# Patient Record
Sex: MALE | Race: WHITE | Employment: UNEMPLOYED | URBAN - METROPOLITAN AREA
[De-identification: names, ages, dates, MRNs, and addresses within clinical notes are randomized per-mention and may not be internally consistent; named-entity substitution may affect disease eponyms.]

---
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
## 2019-07-31 ENCOUNTER — OFFICE VISIT (OUTPATIENT)
Dept: FAMILY MEDICINE | Facility: OTHER | Age: 19
End: 2019-07-31
Payer: COMMERCIAL

## 2019-07-31 ENCOUNTER — NURSE TRIAGE (OUTPATIENT)
Dept: FAMILY MEDICINE | Facility: OTHER | Age: 19
End: 2019-07-31

## 2019-07-31 VITALS
DIASTOLIC BLOOD PRESSURE: 70 MMHG | WEIGHT: 315 LBS | TEMPERATURE: 97.9 F | SYSTOLIC BLOOD PRESSURE: 122 MMHG | OXYGEN SATURATION: 97 % | HEIGHT: 70 IN | RESPIRATION RATE: 16 BRPM | HEART RATE: 77 BPM | BODY MASS INDEX: 45.1 KG/M2

## 2019-07-31 DIAGNOSIS — H65.01 RIGHT ACUTE SEROUS OTITIS MEDIA, RECURRENCE NOT SPECIFIED: ICD-10-CM

## 2019-07-31 DIAGNOSIS — J20.9 ACUTE BRONCHITIS WITH SYMPTOMS > 10 DAYS: Primary | ICD-10-CM

## 2019-07-31 PROCEDURE — 99213 OFFICE O/P EST LOW 20 MIN: CPT | Performed by: PHYSICIAN ASSISTANT

## 2019-07-31 RX ORDER — ALBUTEROL SULFATE 90 UG/1
2 AEROSOL, METERED RESPIRATORY (INHALATION) EVERY 6 HOURS PRN
Qty: 1 INHALER | Refills: 0 | Status: SHIPPED | OUTPATIENT
Start: 2019-07-31

## 2019-07-31 RX ORDER — GUAIFENESIN 600 MG/1
1200 TABLET, EXTENDED RELEASE ORAL 2 TIMES DAILY
Qty: 10 TABLET | Refills: 0 | Status: SHIPPED | OUTPATIENT
Start: 2019-07-31

## 2019-07-31 RX ORDER — PREDNISONE 20 MG/1
20 TABLET ORAL DAILY
Qty: 5 TABLET | Refills: 0 | Status: SHIPPED | OUTPATIENT
Start: 2019-07-31 | End: 2019-12-13

## 2019-07-31 ASSESSMENT — MIFFLIN-ST. JEOR: SCORE: 2491.69

## 2019-07-31 NOTE — PROGRESS NOTES
"Subjective     Jonas Schmidt is a 19 year old male who presents to clinic today for the following health issues:    HPI   Acute Illness   Acute illness concerns: cough, shortness of breath when coughing  Onset: \"a couple weeks ago\"    Fever: no    Chills/Sweats: no    Headache (location?): YES    Sinus Pressure:YES- facial pain    Conjunctivitis:  no    Ear Pain: no    Rhinorrhea: YES    Congestion: YES    Sore Throat: no     Cough: YES-productive of green sputum    Wheeze: no    Shortness of Breath: YES when coughing    Decreased Appetite: YES    Nausea: no    Vomiting: no - \"cough to the point of wanting to throw up\"    Diarrhea:  no    Dysuria/Freq.: no    Fatigue/Achiness: YES    Sick/Strep Exposure: YES- girlfriend     Therapies Tried and outcome: none    He has had a cough for the past 2 weeks and today noticed shortness of breath. He has noticed wheezing as well but denies fevers or chills. He is a smoker 1/2 PPD and is trying to quit. He used to smoke 1.5 PPD. He has not tried anything over the counter. He works concrete construction.    Reviewed and updated as needed this visit by Provider  Tobacco  Allergies  Meds  Problems  Med Hx  Surg Hx  Fam Hx      Review of Systems   ROS COMP: Constitutional, HEENT, cardiovascular, pulmonary, gi and gu systems are negative, except as otherwise noted.      Objective    /70   Pulse 77   Temp 97.9  F (36.6  C) (Temporal)   Resp 16   Ht 1.78 m (5' 10.08\")   Wt 147.4 kg (325 lb)   SpO2 97%   BMI 46.53 kg/m    Body mass index is 46.53 kg/m .  Physical Exam   GENERAL: healthy, alert and no distress  EYES: Eyes grossly normal to inspection, PERRL and conjunctivae and sclerae normal  HENT: ear canals and left TM normal. Right TM is bulging and erythematous with serous effusion. Nasal mucosa is mildly erythematous bilaterally. Pharynx is clear.   NECK: no adenopathy, no asymmetry, masses, or scars and thyroid normal to palpation  RESP: lungs with scattered " wheezes and rhonchi throughout. No rales.   CV: regular rate and rhythm, normal S1 S2, no S3 or S4, no murmur, click or rub      Assessment & Plan       ICD-10-CM    1. Acute bronchitis with symptoms > 10 days J20.9 amoxicillin-clavulanate (AUGMENTIN) 875-125 MG tablet     predniSONE (DELTASONE) 20 MG tablet     albuterol (PROAIR HFA/PROVENTIL HFA/VENTOLIN HFA) 108 (90 Base) MCG/ACT inhaler     guaiFENesin (MUCINEX) 600 MG 12 hr tablet   2. Right acute serous otitis media, recurrence not specified H65.01 amoxicillin-clavulanate (AUGMENTIN) 875-125 MG tablet        Right otitis media along with scattered wheezes and rhonchi throughout consistent with bronchitis. Will treat Augmentin to cover for otitis media and potential development of pneumonia to take as directed to treat for right sided.  I recommend he take a probiotic or daily Activia yogurt while on this.  Will also prescribe prednisone 20 mg to take daily in the mornings with food for 5 days to help with the wheezing and shortness of breath.  Will prescribe an albuterol inhaler to use every 6 hours for the next 2 days then every 6 hours as needed.  Will prescribe Mucinex to help cough become more productive.  I recommend plenty of fluids along with breathing in warm, moist air/humidifer.   Honey and tea can help with cough.  If symptoms are not improving within the next week, he should be seen again.   Instructed to continue to work on quitting smoking.    I recommend he schedule a physical in the next few months.      Aravind Diaz PA-C  [facility]

## 2019-07-31 NOTE — PATIENT INSTRUCTIONS
Will treat you with Augmentin to take as directed to treat for right sided ear infection and bronchitis.  Take a probiotic or daily Activia yogurt while on this.  Will also prescribe prednisone to take daily in the mornings with food for 5 days to help with the wheezing and shortness of breath.  The albuterol inhaler will also help to use every 6 hours for the next 2 days then every 6 hours as needed.  Will prescribe Mucinex to help get up the phlegm.  I recommend plenty of fluids along with breathing in warm, moist air/humidifer.   Honey and tea can help with cough.  If symptoms are not improving within the next week, you should be seen again.   Work on quitting smoking.    Schedule a physical in the next few months.

## 2019-07-31 NOTE — LETTER
[address]  [facility] [address]  Phone: [phone]    July 31, 2019        Jonas Schmidt  [address] [address] [place] [id number]          To whom it may concern:    RE: Jonas Schmidt    Patient was seen and treated today at our clinic. Please excuse patient from work from 7/31-8/2 due to illness. Thank you.     Please contact me for questions or concerns.      Sincerely,        Aravind Diaz PA-C

## 2019-07-31 NOTE — TELEPHONE ENCOUNTER
Spoke with patient.  Started a couple weeks ago with a really bad cough and getting worse.  Feeling good now, but would like to be seen to see if he has anything going on. No SOB unless coughing.  Able to talk in full sentences.    Reason for Disposition    Caller wants child seen for non-urgent problem    Additional Information    Negative: Severe difficulty breathing (struggling for each breath, unable to speak or cry because of difficulty breathing, making grunting noises with each breath)    Negative: Child has passed out or stopped breathing    Negative: Lips or face are bluish (or gray) when not coughing    Negative: Sounds like a life-threatening emergency to the triager    Negative: Stridor (harsh sound with breathing in) is present    Negative: Hoarse voice with deep barky cough and croup in the community    Negative: Choked on a small object or food that could be caught in the throat    Negative: Previous diagnosis of asthma (or RAD) OR regular use of asthma medicines for wheezing    Negative: Age < 2 years and given albuterol inhaler or neb for home treatment to use within the last 2 weeks    Negative: Wheezing is present, but NO previous diagnosis of asthma or NO regular use of asthma medicines for wheezing    Negative: Coughing occurs within 21 days of whooping cough EXPOSURE    Negative: Choked on a small object that could be caught in the throat    Negative: Age < 12 weeks with fever 100.4 F (38.0 C) or higher rectally    Negative: Blood coughed up    Negative: Ribs are pulling in with each breath (retractions) when not coughing    Negative: Stridor (harsh sound with breathing in) is present    Negative: Drooling or spitting out saliva (because can't swallow)    Negative: Fever and weak immune system (sickle cell disease, HIV, chemotherapy, organ transplant, chronic steroids, etc)    Negative: High-risk child (e.g., underlying heart, lung or severe neuromuscular disease)    Negative: Child sounds very  sick or weak to the triager    Negative: Difficulty breathing present when not coughing    Negative: Wheezing (purring or whistling sound) occurs    Negative: Lips have turned bluish during coughing    Negative: Rapid breathing (Breaths/min > 60 if < 2 mo; > 50 if 2-12 mo; > 40 if 1-5 years; > 30 if 6-11 years; > 20 if > 12 years old)    Negative: Fever > 105 F (40.6 C)    Negative: SEVERE chest pain    Negative: Can't take a deep breath because of chest pain    Negative: Continuous (nonstop) coughing    Negative: Age < 3 months old (Exception: coughs a few times)    Negative: Age < 2 years and ear infection suspected by triager    Negative: Fever present > 3 days    Negative: Fever returns after going away > 24 hours and symptoms worse or not improved    Negative: Earache    Negative: Sinus pain (not just congestion) persists > 48 hours after using nasal washes (Age: 6 years or older)    Negative: Age 3-6 months and fever with cough    Negative: Triager thinks child needs to be seen for non-urgent problem    Negative: Cough has been present > 3 weeks    Negative: Whooping cough in the community and coughing lasts > 2 weeks    Negative: Nasal discharge present > 14 days    Negative: Pollen-related cough not responsive to antihistamines    Negative: Coughing has kept home from school for 3 or more days    Negative: Vomiting from hard coughing occurs 3 or more times    Negative: Chest pain that's present even when not coughing    Protocols used: COUGH-P-OH

## 2019-07-31 NOTE — TELEPHONE ENCOUNTER
Reason for call:  Patient reporting a symptom    Symptom or request: hard time breathing thinks he has build up in his chest    Duration (how long have symptoms been present):     Have you been treated for this before? No    Additional comments: patient called to scheduled states non urgent. Please triage    Phone Number patient can be reached at:  [phone]    Best Time:  any    Can we leave a detailed message on this number:  YES    Call taken on 7/31/2019 at 8:49 AM by Mariposa Levin

## 2019-12-11 NOTE — PROGRESS NOTES
"Subjective     Jonas Schmidt is a 19 year old male who presents to clinic today for the following health issues:    HPI   WART(S)  Onset: \"A long time\"    Description:   Location: left forearm  Number of warts: 6  Painful: no    Accompanying Signs & Symptoms:  Signs of infection: no    History:   History of trauma: no  Prior warts: YES - same ones    Therapies Tried and outcome: liquid nitrogen      Patient Active Problem List   Diagnosis     Obesity     Common wart     Anxiety state     GERD (gastroesophageal reflux disease)     ADHD (attention deficit hyperactivity disorder), combined type     Past Surgical History:   Procedure Laterality Date     HC CLOSED TX RAD/ULNAR SHAFT FX W/O MANIP  08/14/2002    Closed reduction percutaneous pinning, ulnar shaft fracture and closed reduction radiocapitellar joint, application of long-arm splint.       Social History     Tobacco Use     Smoking status: Current Every Day Smoker     Packs/day: 0.50     Years: 3.00     Pack years: 1.50     Types: Cigarettes     Smokeless tobacco: Never Used     Tobacco comment: no smokers at home   Substance Use Topics     Alcohol use: No     Family History   Problem Relation Age of Onset     Hypertension Mother      Thyroid Disease Mother      Heart Disease Mother         Murmur     Cancer Maternal Grandmother      Family History Negative No family hx of      Diabetes No family hx of      Lipids No family hx of          Current Outpatient Medications   Medication Sig Dispense Refill     albuterol (PROAIR HFA, PROVENTIL HFA, VENTOLIN HFA) 108 (90 BASE) MCG/ACT inhaler Inhale 2 puffs into the lungs every 6 hours as needed for shortness of breath / dyspnea or wheezing 1 Inhaler 0     atomoxetine (STRATTERA) 40 MG capsule Take 1 capsule (40 mg) by mouth daily 30 capsule 1     cetirizine (ZYRTEC) 10 MG tablet Take 1 tablet (10 mg) by mouth every evening 90 tablet 1     fluticasone (FLONASE) 50 MCG/ACT nasal spray Spray 1-2 sprays into both " "nostrils daily 1 Package 11     guaiFENesin (MUCINEX) 600 MG 12 hr tablet Take 2 tablets (1,200 mg) by mouth 2 times daily 10 tablet 0     omeprazole 20 MG tablet Take 1 tablet (20 mg) by mouth daily Take 30-60 minutes before a meal. 90 tablet 1     albuterol (PROAIR HFA/PROVENTIL HFA/VENTOLIN HFA) 108 (90 Base) MCG/ACT inhaler Inhale 2 puffs into the lungs every 6 hours as needed for shortness of breath / dyspnea or wheezing (Patient not taking: Reported on 12/13/2019) 1 Inhaler 0     Salicylic Acid 40 % PADS Externally apply 1 each topically At Bedtime 3 each 0     No Known Allergies  BP Readings from Last 3 Encounters:   12/13/19 112/66   07/31/19 122/70   08/23/18 110/70    Wt Readings from Last 3 Encounters:   12/13/19 (!) 153.7 kg (338 lb 14.4 oz)   07/31/19 147.4 kg (325 lb)   08/23/18 139.3 kg (307 lb) (>99 %)*     * Growth percentiles are based on CDC (Boys, 2-20 Years) data.                    Reviewed and updated as needed this visit by Provider         Review of Systems   ROS COMP: Constitutional, HEENT, cardiovascular, pulmonary, gi and gu systems are negative, except as otherwise noted.      Objective    /66   Pulse 63   Temp 97.7  F (36.5  C) (Oral)   Resp 16   Ht 1.791 m (5' 10.51\")   Wt (!) 153.7 kg (338 lb 14.4 oz)   SpO2 99%   BMI 47.92 kg/m    Body mass index is 47.92 kg/m .  Physical Exam   GENERAL APPEARANCE: alert and no distress  SKIN: 5 hyperkeratotic warts on left proximal extensor surface of forearm and one small papular wart    Diagnostic Test Results:  Labs reviewed in Epic        Assessment & Plan     1. Viral warts, unspecified type  Procedure note:   Procedure was discussed with patient. Site(s) as described above were identified and cleaned with alcohol.          The site were identified. The canister is held in an upright position with the cryotip nozzle approximately 1 cm away from eash  lesion before the trigger is pressed to activate flow of Liquid Nitrogen.  3-4 " freeze-thaw cycles were given with duration of 4-5 sec each till a frost rim of 1mm is noticed around the lesion. A total of 6 warts were treated          Patient was given instruction on after care and hand out on warts.   He will follow up with Junior Flaherty for destruction of wart roots in one month. Instructed to use Compound W daily in the meantime.       No follow-ups on file.    MONIE Gallardo [facility]

## 2019-12-13 ENCOUNTER — OFFICE VISIT (OUTPATIENT)
Dept: FAMILY MEDICINE | Facility: OTHER | Age: 19
End: 2019-12-13
Payer: COMMERCIAL

## 2019-12-13 VITALS
SYSTOLIC BLOOD PRESSURE: 112 MMHG | HEART RATE: 63 BPM | OXYGEN SATURATION: 99 % | HEIGHT: 71 IN | TEMPERATURE: 97.7 F | RESPIRATION RATE: 16 BRPM | WEIGHT: 315 LBS | BODY MASS INDEX: 44.1 KG/M2 | DIASTOLIC BLOOD PRESSURE: 66 MMHG

## 2019-12-13 DIAGNOSIS — B07.9 VIRAL WARTS, UNSPECIFIED TYPE: Primary | ICD-10-CM

## 2019-12-13 PROCEDURE — 99207 ZZC DROP WITH A PROCEDURE: CPT | Performed by: STUDENT IN AN ORGANIZED HEALTH CARE EDUCATION/TRAINING PROGRAM

## 2019-12-13 PROCEDURE — 17110 DESTRUCTION B9 LES UP TO 14: CPT | Performed by: STUDENT IN AN ORGANIZED HEALTH CARE EDUCATION/TRAINING PROGRAM

## 2019-12-13 ASSESSMENT — MIFFLIN-ST. JEOR: SCORE: 2561.62

## 2019-12-13 NOTE — PATIENT INSTRUCTIONS
Remove the dead skin with a pumice stone or callus shaver first every day and the apply salicylic acid (Compound W) to each wart and cover with a bandage.    See Junior Sorensen as scheduled to burn the root of the wart.    Martha Gardner, NP-C

## 2020-08-18 ENCOUNTER — NURSE TRIAGE (OUTPATIENT)
Dept: NURSING | Facility: CLINIC | Age: 20
End: 2020-08-18

## 2020-08-18 NOTE — TELEPHONE ENCOUNTER
Leelee is calling about stinging pain and bleeding from his belly button    He first noticed a stinging pain around his belly button area yesterday.    Today when he came home he noticed bleeding from his belly button. There is increased pink around the belly button.    When he applied pressure to the area, additional bloody drainage came out.    Standing straight is uncomfortable.    Per protocol, advised to be seen/evaluated within 24 hours.  Care Advice reviewed.    Scheduling offered - declined  He is no longer near an [facility] clinic.  He will either go to an [facility] or call a local clinic tomorrow.    COVID 19 Nurse Triage Plan/Patient Instructions    Please be aware that novel coronavirus (COVID-19) may be circulating in the community. If you develop symptoms such as fever, cough, or SOB or if you have concerns about the presence of another infection including coronavirus (COVID-19), please contact your health care provider or visit [email or web address].     Disposition/Instructions    In-Person Visit with provider recommended. Reference Visit Selection Guide.    Thank you for taking steps to prevent the spread of this virus.  o Limit your contact with others.  o Wear a simple mask to cover your cough.  o Wash your hands well and often.    Resources    [facility]: About COVID-19: [email or web address]    CDC: What to Do If You're Sick: www.cdc.gov/coronavirus/2019-ncov/about/steps-when-sick.html    CDC: Ending Home Isolation: www.cdc.gov/coronavirus/2019-ncov/hcp/disposition-in-home-patients.html     CDC: Caring for Someone: www.cdc.gov/coronavirus/2019-ncov/if-you-are-sick/care-for-someone.html     [facility]: Interim Guidance for Hospital Discharge to Home: www.health.[email or web address].mn.us/diseases/coronavirus/hcp/hospdischarge.pdf    [facility] clinical trials (COVID-19 research studies): [email or web address]/umn-[email or web address]     Below are the COVID-19 hotlines at the [facility]  ([facility]). Interpreters are available.   o For health questions: Call 572-567-7716 or 1-868.639.1182 (7 a.m. to 7 p.m.)  o For questions about schools and childcare: Call 472-515-6972 or 1-472.177.4902 (7 a.m. to 7 p.m.)     Rhonda Jaeger RN  [facility]      Additional Information    Negative: [1] Major bleeding (e.g., actively dripping or spurting) AND [2] can't be stopped    Negative: Amputation    Negative: Shock suspected (e.g., cold/pale/clammy skin, too weak to stand, low BP, rapid pulse)    Negative: [1] Knife wound (or other possibly deep cut) AND [2] to chest, abdomen, back, neck, or head    Negative: [1] Cutter (self-mutilator) AND [2] suicidal or out-of-control    Negative: Sounds like a life-threatening emergency to the triager    Negative: [1] Bleeding AND [2] won't stop after 10 minutes of direct pressure (using correct technique)    Negative: Skin is split open or gaping (or length > 1/2 inch or 12 mm on the skin, 1/4 inch or 6 mm on the face)    Negative: [1] Deep cut AND [2] can see bone or tendons    Negative: Sensation of something in the wound (i.e., retained object in wound)    Negative: [1] Dirt in the wound AND [2] not removed with 15 minutes of scrubbing    Negative: Wound causes numbness (i.e., loss of sensation)    Negative: Wound causes weakness (i.e., decreased ability to move hand, finger, toe)    Negative: [1] SEVERE pain AND [2] not improved 2 hours after pain medicine    Negative: [1] Looks infected AND [2] large red area (>2 inches or 5 cm) or streak    Negative: [1] Fever AND [2] bright red area or streak    Negative: Suspicious history for the injury    [1] Looks infected (spreading redness, pus) AND [2] no fever    Protocols used: CUTS AND [id number]

## 2022-09-15 NOTE — PATIENT INSTRUCTIONS
I will let you know when I receive confirmed read of xray by radiologist the results of your xray.    Please continue to ice with ice pack or emersion into ice tub for 15-20 minutes every 3 hours for first 48-72 hours until swelling is down. Rest by minimizing weight bearing using crutches and air cast for a few weeks. Compression with ace wrap. Elevate as much as possible to heart level or higher 3 times daily.     Return to clinic in 2 weeks for re-evaluation.     Thank you,  Stephenie Pineda CNP    
no